# Patient Record
Sex: FEMALE | Race: BLACK OR AFRICAN AMERICAN | Employment: OTHER | URBAN - METROPOLITAN AREA
[De-identification: names, ages, dates, MRNs, and addresses within clinical notes are randomized per-mention and may not be internally consistent; named-entity substitution may affect disease eponyms.]

---

## 2017-03-31 ENCOUNTER — HOSPITAL ENCOUNTER (OUTPATIENT)
Dept: MAMMOGRAPHY | Age: 57
Discharge: HOME | End: 2017-03-31
Payer: COMMERCIAL

## 2017-03-31 DIAGNOSIS — Z12.31 VISIT FOR SCREENING MAMMOGRAM: ICD-10-CM

## 2017-03-31 PROCEDURE — 77063 BREAST TOMOSYNTHESIS BI: CPT

## 2017-05-10 ENCOUNTER — EMPLOYEE WELLNESS (OUTPATIENT)
Dept: OTHER | Age: 57
End: 2017-05-10

## 2017-05-10 LAB
CHOLESTEROL, TOTAL: 280 MG/DL (ref 0–199)
GLUCOSE BLD-MCNC: 84 MG/DL (ref 74–109)
HDLC SERPL-MCNC: 132 MG/DL (ref 40–59)
LDL CHOLESTEROL CALCULATED: 136 MG/DL (ref 0–129)
TRIGL SERPL-MCNC: 58 MG/DL (ref 0–200)

## 2017-07-14 ENCOUNTER — HOSPITAL ENCOUNTER (OUTPATIENT)
Dept: WOMENS IMAGING | Age: 57
Discharge: HOME | End: 2017-07-14
Payer: COMMERCIAL

## 2017-07-14 DIAGNOSIS — Z13.9 SCREENING: ICD-10-CM

## 2017-07-14 PROCEDURE — 77063 BREAST TOMOSYNTHESIS BI: CPT

## 2018-03-20 VITALS — BODY MASS INDEX: 21.11 KG/M2 | WEIGHT: 123 LBS

## 2018-04-26 ENCOUNTER — EMPLOYEE WELLNESS (OUTPATIENT)
Dept: INTERNAL MEDICINE CLINIC | Age: 58
End: 2018-04-26

## 2018-04-26 LAB
CHOLESTEROL, TOTAL: 248 MG/DL (ref 0–199)
GLUCOSE BLD-MCNC: 92 MG/DL (ref 74–109)
HDLC SERPL-MCNC: 111 MG/DL (ref 40–59)
LDL CHOLESTEROL CALCULATED: 121 MG/DL (ref 0–129)
TRIGL SERPL-MCNC: 80 MG/DL (ref 0–200)

## 2018-05-02 VITALS — BODY MASS INDEX: 21.46 KG/M2 | WEIGHT: 125 LBS

## 2018-05-10 ENCOUNTER — HOSPITAL ENCOUNTER (OUTPATIENT)
Dept: MAMMOGRAPHY | Age: 58
Discharge: HOME | End: 2018-05-10
Payer: COMMERCIAL

## 2018-05-10 DIAGNOSIS — Z12.31 VISIT FOR SCREENING MAMMOGRAM: ICD-10-CM

## 2018-05-10 PROCEDURE — 77063 BREAST TOMOSYNTHESIS BI: CPT

## 2018-06-08 ENCOUNTER — HOSPITAL ENCOUNTER (OUTPATIENT)
Dept: GENERAL RADIOLOGY | Age: 58
Discharge: HOME | End: 2018-06-08
Payer: COMMERCIAL

## 2018-06-08 DIAGNOSIS — M25.512 LEFT SHOULDER PAIN: ICD-10-CM

## 2018-06-08 DIAGNOSIS — R52 PAIN: ICD-10-CM

## 2018-06-08 PROCEDURE — 73030 X-RAY EXAM OF SHOULDER: CPT

## 2018-06-08 PROCEDURE — 73050 X-RAY EXAM OF SHOULDERS: CPT

## 2018-08-21 ENCOUNTER — HOSPITAL ENCOUNTER (OUTPATIENT)
Dept: WOMENS IMAGING | Age: 58
Discharge: HOME | End: 2018-08-23
Payer: COMMERCIAL

## 2018-08-21 DIAGNOSIS — Z12.31 ENCOUNTER FOR SCREENING MAMMOGRAM FOR BREAST CANCER: ICD-10-CM

## 2018-08-21 PROCEDURE — 77063 BREAST TOMOSYNTHESIS BI: CPT

## 2018-09-06 ENCOUNTER — HOSPITAL ENCOUNTER (OUTPATIENT)
Dept: GENERAL RADIOLOGY | Age: 58
Discharge: HOME | End: 2018-09-06
Payer: COMMERCIAL

## 2018-09-06 DIAGNOSIS — M79.671 RIGHT FOOT PAIN: ICD-10-CM

## 2018-09-06 PROCEDURE — 73630 X-RAY EXAM OF FOOT: CPT

## 2019-02-25 ENCOUNTER — E-VISIT (OUTPATIENT)
Dept: INTERNAL MEDICINE CLINIC | Age: 59
End: 2019-02-25
Payer: COMMERCIAL

## 2019-02-25 DIAGNOSIS — J20.9 ACUTE BRONCHITIS, UNSPECIFIED ORGANISM: Primary | ICD-10-CM

## 2019-02-25 PROCEDURE — 98969 PR NONPHYSICIAN ONLINE ASSESSMENT AND MANAGEMENT: CPT | Performed by: FAMILY MEDICINE

## 2019-02-25 RX ORDER — AZITHROMYCIN 250 MG/1
TABLET, FILM COATED ORAL
Qty: 1 PACKET | Refills: 0 | Status: SHIPPED | OUTPATIENT
Start: 2019-02-25 | End: 2019-03-07

## 2019-02-25 ASSESSMENT — LIFESTYLE VARIABLES: SMOKING_STATUS: NO, I'VE NEVER SMOKED

## 2019-05-23 ENCOUNTER — HOSPITAL ENCOUNTER (OUTPATIENT)
Dept: GENERAL RADIOLOGY | Age: 59
Discharge: HOME | End: 2019-05-23
Payer: COMMERCIAL

## 2019-05-23 DIAGNOSIS — M25.561 RIGHT KNEE PAIN: ICD-10-CM

## 2019-05-23 PROCEDURE — 73562 X-RAY EXAM OF KNEE 3: CPT

## 2019-05-31 ENCOUNTER — HOSPITAL ENCOUNTER (OUTPATIENT)
Dept: MAMMOGRAPHY | Age: 59
Discharge: HOME | End: 2019-05-31
Payer: COMMERCIAL

## 2019-05-31 DIAGNOSIS — Z12.31 VISIT FOR SCREENING MAMMOGRAM: ICD-10-CM

## 2019-05-31 PROCEDURE — 77063 BREAST TOMOSYNTHESIS BI: CPT

## 2019-08-22 ENCOUNTER — HOSPITAL ENCOUNTER (OUTPATIENT)
Dept: WOMENS IMAGING | Age: 59
Discharge: HOME | End: 2019-08-24
Payer: COMMERCIAL

## 2019-08-22 DIAGNOSIS — Z12.31 ENCOUNTER FOR SCREENING MAMMOGRAM FOR BREAST CANCER: ICD-10-CM

## 2019-08-22 PROCEDURE — 77063 BREAST TOMOSYNTHESIS BI: CPT

## 2019-11-07 ENCOUNTER — OFFICE VISIT (OUTPATIENT)
Dept: FAMILY MEDICINE CLINIC | Age: 59
End: 2019-11-07
Payer: COMMERCIAL

## 2019-11-07 VITALS
WEIGHT: 131 LBS | TEMPERATURE: 97.6 F | DIASTOLIC BLOOD PRESSURE: 70 MMHG | RESPIRATION RATE: 14 BRPM | BODY MASS INDEX: 22.36 KG/M2 | SYSTOLIC BLOOD PRESSURE: 118 MMHG | HEART RATE: 67 BPM | HEIGHT: 64 IN | OXYGEN SATURATION: 98 %

## 2019-11-07 DIAGNOSIS — D49.3 NEOPLASM OF BREAST: Primary | ICD-10-CM

## 2019-11-07 PROCEDURE — 99213 OFFICE O/P EST LOW 20 MIN: CPT | Performed by: FAMILY MEDICINE

## 2019-11-07 ASSESSMENT — PATIENT HEALTH QUESTIONNAIRE - PHQ9
SUM OF ALL RESPONSES TO PHQ QUESTIONS 1-9: 0
2. FEELING DOWN, DEPRESSED OR HOPELESS: 0
SUM OF ALL RESPONSES TO PHQ QUESTIONS 1-9: 0
1. LITTLE INTEREST OR PLEASURE IN DOING THINGS: 0
SUM OF ALL RESPONSES TO PHQ9 QUESTIONS 1 & 2: 0

## 2019-11-11 ENCOUNTER — HOSPITAL ENCOUNTER (OUTPATIENT)
Dept: ULTRASOUND IMAGING | Age: 59
Discharge: HOME | End: 2019-11-13
Payer: COMMERCIAL

## 2019-11-11 ENCOUNTER — HOSPITAL ENCOUNTER (OUTPATIENT)
Dept: WOMENS IMAGING | Age: 59
Discharge: HOME | End: 2019-11-13
Payer: COMMERCIAL

## 2019-11-11 ENCOUNTER — TELEPHONE (OUTPATIENT)
Dept: FAMILY MEDICINE CLINIC | Age: 59
End: 2019-11-11

## 2019-11-11 DIAGNOSIS — R92.8 ABNORMAL MAMMOGRAM OF LEFT BREAST: Primary | ICD-10-CM

## 2019-11-11 DIAGNOSIS — D49.3 NEOPLASM OF BREAST: ICD-10-CM

## 2019-11-11 DIAGNOSIS — R92.8 ABNORMAL MAMMOGRAM: ICD-10-CM

## 2019-11-11 PROCEDURE — G0279 TOMOSYNTHESIS, MAMMO: HCPCS

## 2019-11-11 PROCEDURE — 76642 ULTRASOUND BREAST LIMITED: CPT

## 2020-06-26 ENCOUNTER — HOSPITAL ENCOUNTER (OUTPATIENT)
Dept: MAMMOGRAPHY | Age: 60
Discharge: HOME | End: 2020-06-26
Attending: FAMILY MEDICINE
Payer: COMMERCIAL

## 2020-06-26 DIAGNOSIS — Z12.31 VISIT FOR SCREENING MAMMOGRAM: ICD-10-CM

## 2020-06-26 PROCEDURE — 77063 BREAST TOMOSYNTHESIS BI: CPT

## 2020-08-25 ENCOUNTER — EMPLOYEE WELLNESS (OUTPATIENT)
Dept: OTHER | Age: 60
End: 2020-08-25

## 2020-08-25 LAB
CHOLESTEROL, TOTAL: 224 MG/DL (ref 0–199)
GLUCOSE BLD-MCNC: 93 MG/DL (ref 70–99)
HDLC SERPL-MCNC: 81 MG/DL (ref 40–59)
LDL CHOLESTEROL CALCULATED: 116 MG/DL (ref 0–129)
TRIGL SERPL-MCNC: 134 MG/DL (ref 0–150)

## 2020-10-19 VITALS — BODY MASS INDEX: 22.14 KG/M2 | WEIGHT: 129 LBS

## 2020-10-20 ENCOUNTER — HOSPITAL ENCOUNTER (OUTPATIENT)
Dept: WOMENS IMAGING | Age: 60
Discharge: HOME | End: 2020-10-22
Payer: COMMERCIAL

## 2020-10-20 PROCEDURE — 77063 BREAST TOMOSYNTHESIS BI: CPT

## 2021-07-02 ENCOUNTER — HOSPITAL ENCOUNTER (OUTPATIENT)
Dept: WOMENS IMAGING | Age: 61
Discharge: HOME | End: 2021-07-02
Attending: FAMILY MEDICINE
Payer: COMMERCIAL

## 2021-07-02 DIAGNOSIS — Z13.9 ENCOUNTER FOR SCREENING: ICD-10-CM

## 2021-07-02 PROCEDURE — 77063 BREAST TOMOSYNTHESIS BI: CPT

## 2021-07-23 LAB
CHOLESTEROL, TOTAL: 261 MG/DL (ref 0–199)
GLUCOSE BLD-MCNC: 89 MG/DL (ref 70–99)
HDLC SERPL-MCNC: 88 MG/DL (ref 40–59)
LDL CHOLESTEROL CALCULATED: 149 MG/DL (ref 0–129)
TRIGL SERPL-MCNC: 119 MG/DL (ref 0–150)

## 2021-11-04 ENCOUNTER — HOSPITAL ENCOUNTER (OUTPATIENT)
Dept: WOMENS IMAGING | Age: 61
Discharge: HOME | End: 2021-11-06
Payer: COMMERCIAL

## 2021-11-04 VITALS — BODY MASS INDEX: 22.14 KG/M2 | HEIGHT: 64 IN

## 2021-11-04 DIAGNOSIS — Z12.31 ENCOUNTER FOR SCREENING MAMMOGRAM FOR MALIGNANT NEOPLASM OF BREAST: ICD-10-CM

## 2021-11-04 PROCEDURE — 77063 BREAST TOMOSYNTHESIS BI: CPT

## 2022-04-03 ENCOUNTER — OFFICE VISIT (OUTPATIENT)
Dept: FAMILY MEDICINE CLINIC | Age: 62
End: 2022-04-03

## 2022-04-03 ENCOUNTER — OFFICE VISIT (OUTPATIENT)
Dept: FAMILY MEDICINE CLINIC | Age: 62
End: 2022-04-03
Payer: COMMERCIAL

## 2022-04-03 VITALS
SYSTOLIC BLOOD PRESSURE: 128 MMHG | BODY MASS INDEX: 22.2 KG/M2 | HEIGHT: 64 IN | DIASTOLIC BLOOD PRESSURE: 78 MMHG | OXYGEN SATURATION: 97 % | TEMPERATURE: 98.5 F | WEIGHT: 130 LBS

## 2022-04-03 VITALS
DIASTOLIC BLOOD PRESSURE: 78 MMHG | OXYGEN SATURATION: 97 % | HEART RATE: 87 BPM | WEIGHT: 130 LBS | SYSTOLIC BLOOD PRESSURE: 128 MMHG | BODY MASS INDEX: 22.2 KG/M2 | TEMPERATURE: 98.5 F | HEIGHT: 64 IN

## 2022-04-03 DIAGNOSIS — H00.011 HORDEOLUM EXTERNUM OF RIGHT UPPER EYELID: Primary | ICD-10-CM

## 2022-04-03 PROCEDURE — 99213 OFFICE O/P EST LOW 20 MIN: CPT | Performed by: PHYSICIAN ASSISTANT

## 2022-04-03 RX ORDER — SULFAMETHOXAZOLE AND TRIMETHOPRIM 800; 160 MG/1; MG/1
1 TABLET ORAL 2 TIMES DAILY
Qty: 14 TABLET | Refills: 0 | Status: SHIPPED | OUTPATIENT
Start: 2022-04-03 | End: 2022-04-10

## 2022-04-03 RX ORDER — PAROXETINE 10 MG/1
10 TABLET, FILM COATED ORAL EVERY MORNING
COMMUNITY

## 2022-04-03 SDOH — ECONOMIC STABILITY: FOOD INSECURITY: WITHIN THE PAST 12 MONTHS, THE FOOD YOU BOUGHT JUST DIDN'T LAST AND YOU DIDN'T HAVE MONEY TO GET MORE.: NEVER TRUE

## 2022-04-03 SDOH — ECONOMIC STABILITY: FOOD INSECURITY: WITHIN THE PAST 12 MONTHS, YOU WORRIED THAT YOUR FOOD WOULD RUN OUT BEFORE YOU GOT MONEY TO BUY MORE.: NEVER TRUE

## 2022-04-03 ASSESSMENT — PATIENT HEALTH QUESTIONNAIRE - PHQ9
SUM OF ALL RESPONSES TO PHQ QUESTIONS 1-9: 0
SUM OF ALL RESPONSES TO PHQ QUESTIONS 1-9: 0
1. LITTLE INTEREST OR PLEASURE IN DOING THINGS: 0
SUM OF ALL RESPONSES TO PHQ QUESTIONS 1-9: 0
SUM OF ALL RESPONSES TO PHQ QUESTIONS 1-9: 0
2. FEELING DOWN, DEPRESSED OR HOPELESS: 0
SUM OF ALL RESPONSES TO PHQ9 QUESTIONS 1 & 2: 0

## 2022-04-03 ASSESSMENT — ENCOUNTER SYMPTOMS
PHOTOPHOBIA: 0
SHORTNESS OF BREATH: 0
EYE REDNESS: 1
VOMITING: 0
EYE DISCHARGE: 1
EYE PAIN: 1
BLOOD IN STOOL: 0
NAUSEA: 0

## 2022-04-03 ASSESSMENT — SOCIAL DETERMINANTS OF HEALTH (SDOH): HOW HARD IS IT FOR YOU TO PAY FOR THE VERY BASICS LIKE FOOD, HOUSING, MEDICAL CARE, AND HEATING?: NOT VERY HARD

## 2022-04-03 NOTE — PROGRESS NOTES
Subjective:      Patient ID: Richmond De La Paz is a 57 y.o. female who presents today for:  Chief Complaint   Patient presents with    Eye Problem     left eye looks red and puffy        Pt presents w/ swelling to R eye that worsened and now w/erythema, scant crusting drainage, tenderness  No FCNVD        No past medical history on file. No past surgical history on file. No family history on file. Social History     Socioeconomic History    Marital status:      Spouse name: Not on file    Number of children: Not on file    Years of education: Not on file    Highest education level: Not on file   Occupational History    Not on file   Tobacco Use    Smoking status: Not on file    Smokeless tobacco: Not on file   Substance and Sexual Activity    Alcohol use: Not on file    Drug use: Not on file    Sexual activity: Not on file   Other Topics Concern    Not on file   Social History Narrative    Not on file     Social Determinants of Health     Financial Resource Strain: Low Risk     Difficulty of Paying Living Expenses: Not very hard   Food Insecurity: No Food Insecurity    Worried About Running Out of Food in the Last Year: Never true    Guillermo of Food in the Last Year: Never true   Transportation Needs:     Lack of Transportation (Medical): Not on file    Lack of Transportation (Non-Medical):  Not on file   Physical Activity:     Days of Exercise per Week: Not on file    Minutes of Exercise per Session: Not on file   Stress:     Feeling of Stress : Not on file   Social Connections:     Frequency of Communication with Friends and Family: Not on file    Frequency of Social Gatherings with Friends and Family: Not on file    Attends Evangelical Services: Not on file    Active Member of Clubs or Organizations: Not on file    Attends Club or Organization Meetings: Not on file    Marital Status: Not on file   Intimate Partner Violence:     Fear of Current or Ex-Partner: Not on file   Joanne Villa Emotionally Abused: Not on file    Physically Abused: Not on file    Sexually Abused: Not on file   Housing Stability:     Unable to Pay for Housing in the Last Year: Not on file    Number of Places Lived in the Last Year: Not on file    Unstable Housing in the Last Year: Not on file     Current Outpatient Medications on File Prior to Visit   Medication Sig Dispense Refill    PARoxetine (PAXIL) 10 MG tablet Take 10 mg by mouth every morning       No current facility-administered medications on file prior to visit. Patient has no known allergies. Review of Systems   HENT: Negative for ear discharge, ear pain, hearing loss, nosebleeds and tinnitus. Eyes: Positive for pain, discharge and redness. Negative for photophobia and visual disturbance. Respiratory: Negative for shortness of breath. Cardiovascular: Negative for chest pain. Gastrointestinal: Negative for blood in stool, nausea and vomiting. Endocrine: Negative for polydipsia. Musculoskeletal: Negative for myalgias. Skin: Negative for rash. Neurological: Negative for headaches. Hematological: Does not bruise/bleed easily. Psychiatric/Behavioral: Negative for hallucinations and suicidal ideas. All other systems reviewed and are negative. Objective:   /78 (Site: Left Upper Arm, Position: Sitting, Cuff Size: Medium Adult)   Pulse 87   Temp 98.5 °F (36.9 °C) (Temporal)   Ht 5' 4\" (1.626 m)   Wt 130 lb (59 kg)   SpO2 97%   BMI 22.31 kg/m²     Physical Exam  Vitals and nursing note reviewed. Constitutional:       Appearance: She is well-developed. HENT:      Head: Normocephalic and atraumatic. Right Ear: External ear normal.      Left Ear: External ear normal.      Nose: No congestion. Mouth/Throat:      Mouth: Mucous membranes are moist.      Pharynx: No posterior oropharyngeal erythema. Eyes:      General: No scleral icterus. Extraocular Movements: Extraocular movements intact. Conjunctiva/sclera: Conjunctivae normal.      Pupils: Pupils are equal, round, and reactive to light. Comments: R upper lid centrally located nodular swelling w/erythema and TTP   Neck:      Thyroid: No thyromegaly. Cardiovascular:      Rate and Rhythm: Normal rate and regular rhythm. Heart sounds: No murmur heard. Pulmonary:      Effort: Pulmonary effort is normal. No respiratory distress. Breath sounds: Normal breath sounds. No wheezing. Abdominal:      General: Bowel sounds are normal. There is no distension. Palpations: Abdomen is soft. Musculoskeletal:         General: Normal range of motion. Cervical back: Normal range of motion and neck supple. Skin:     General: Skin is warm and dry. Neurological:      Mental Status: She is alert and oriented to person, place, and time. Cranial Nerves: No cranial nerve deficit. Psychiatric:         Mood and Affect: Mood normal.         Behavior: Behavior normal.         Thought Content: Thought content normal.         Judgment: Judgment normal.         Assessment:       Diagnosis Orders   1. Hordeolum externum of right upper eyelid  sulfamethoxazole-trimethoprim (BACTRIM DS) 800-160 MG per tablet       Plan:      No orders of the defined types were placed in this encounter. Orders Placed This Encounter   Medications    sulfamethoxazole-trimethoprim (BACTRIM DS) 800-160 MG per tablet     Sig: Take 1 tablet by mouth 2 times daily for 7 days     Dispense:  14 tablet     Refill:  0       Return if symptoms worsen or fail to improve, for follow up w/PCP in 1-2 weeks. Reviewed with the patient: current clinicalstatus, medications, activities and diet. Side effects, adverse effects of the medication prescribedtoday, as well as treatment plan/ rationale and result expectations have been discussedwith the patient who expresses understanding and desires to proceed.     Close follow upto evaluate treatment results and for coordination of care. I have reviewedthe patient's medical history in detail and updated the computerized patient record.     Rafal Sheldon PA-C

## 2022-04-13 ASSESSMENT — ENCOUNTER SYMPTOMS
EYE DISCHARGE: 1
NAUSEA: 0
VOMITING: 0
EYE PAIN: 1
BLOOD IN STOOL: 0
EYE REDNESS: 1
PHOTOPHOBIA: 0
SHORTNESS OF BREATH: 0

## 2022-04-13 NOTE — PROGRESS NOTES
Subjective:      Patient ID: Jose Hendrix is a 57 y.o. female who presents today for:  Chief Complaint   Patient presents with    Eye Problem       Pt presents w/ swelling to R eye that worsened and now w/erythema, scant crusting drainage, tenderness  No FCNVD      Past Medical History:   Diagnosis Date    High triglycerides 2/19/2013    Low back pain 2/19/2013    Migraine equivalent 9/24/2015     Past Surgical History:   Procedure Laterality Date    DILATION AND CURETTAGE OF UTERUS  1984    HYSTERECTOMY  2005    total    OVARY REMOVAL      TONSILLECTOMY  1960     Family History   Problem Relation Age of Onset    Breast Cancer Mother      Social History     Socioeconomic History    Marital status:      Spouse name: Not on file    Number of children: Not on file    Years of education: Not on file    Highest education level: Not on file   Occupational History    Not on file   Tobacco Use    Smoking status: Never Smoker    Smokeless tobacco: Never Used   Substance and Sexual Activity    Alcohol use: No    Drug use: No    Sexual activity: Yes     Partners: Male   Other Topics Concern    Not on file   Social History Narrative    Not on file     Social Determinants of Health     Financial Resource Strain:     Difficulty of Paying Living Expenses: Not on file   Food Insecurity:     Worried About [address] in the Last Year: Not on file    Lee of Food in the Last Year: Not on file   Transportation Needs:     Lack of Transportation (Medical): Not on file    Lack of Transportation (Non-Medical):  Not on file   Physical Activity:     Days of Exercise per Week: Not on file    Minutes of Exercise per Session: Not on file   Stress:     Feeling of Stress : Not on file   Social Connections:     Frequency of Communication with Friends and Family: Not on file    Frequency of Social Gatherings with Friends and Family: Not on file    Attends Confucianism Services: Not on file   Sonal Active Member of Clubs or Organizations: Not on file    Attends Club or Organization Meetings: Not on file    Marital Status: Not on file   Intimate Partner Violence:     Fear of Current or Ex-Partner: Not on file    Emotionally Abused: Not on file    Physically Abused: Not on file    Sexually Abused: Not on file   Housing Stability:     Unable to Pay for Housing in the Last Year: Not on file    Number of [place] in the Last Year: Not on file    Unstable Housing in the Last Year: Not on file     Current Outpatient Medications on File Prior to Visit   Medication Sig Dispense Refill    aspirin 81 MG tablet Take 81 mg by mouth daily      PARoxetine (PAXIL) 10 MG tablet Take 1 tablet by mouth daily (replaces Brisdelle) 1 tablet 0    folic acid (FOLVITE) 031 MCG tablet Take 400 mcg by mouth daily      calcium carbonate (OSCAL) 500 MG TABS tablet Take 500 mg by mouth 2 times daily      valACYclovir (VALTREX) 500 MG tablet Take 500 mg by mouth daily. No current facility-administered medications on file prior to visit. Patient has no known allergies. Review of Systems   HENT: Negative for ear discharge, ear pain, hearing loss, nosebleeds and tinnitus. Eyes: Positive for pain, discharge and redness. Negative for photophobia. Respiratory: Negative for shortness of breath. Cardiovascular: Negative for chest pain. Gastrointestinal: Negative for blood in stool, nausea and vomiting. Endocrine: Negative for polydipsia. Musculoskeletal: Negative for myalgias. Skin: Negative for rash. Neurological: Negative for headaches. Hematological: Does not bruise/bleed easily. Psychiatric/Behavioral: Negative for hallucinations and suicidal ideas. All other systems reviewed and are negative.       Objective:   /78 (Site: Left Upper Arm, Position: Sitting, Cuff Size: Medium Adult)   Temp 98.5 °F (36.9 °C) (Temporal)   Ht 5' 4\" (1.626 m)   Wt 130 lb (59 kg)   SpO2 97%   BMI 22.31 kg/m²     Physical Exam  Vitals and nursing note reviewed. Constitutional:       Appearance: She is well-developed. HENT:      Head: Normocephalic and atraumatic. Right Ear: External ear normal.      Left Ear: External ear normal.      Nose: Nose normal. No congestion. Mouth/Throat:      Mouth: Mucous membranes are moist.   Eyes:      General: No scleral icterus. Right eye: Discharge present. Left eye: No discharge. Extraocular Movements: Extraocular movements intact. Pupils: Pupils are equal, round, and reactive to light. Comments: R periorbital swelling, normal conjunctiva    Neck:      Thyroid: No thyromegaly. Cardiovascular:      Rate and Rhythm: Normal rate and regular rhythm. Heart sounds: No murmur heard. Pulmonary:      Effort: Pulmonary effort is normal. No respiratory distress. Breath sounds: Normal breath sounds. No wheezing. Abdominal:      General: Bowel sounds are normal. There is no distension. Palpations: Abdomen is soft. Musculoskeletal:         General: Normal range of motion. Cervical back: Normal range of motion and neck supple. Skin:     General: Skin is warm and dry. Neurological:      Mental Status: She is alert and oriented to person, place, and time. Cranial Nerves: No cranial nerve deficit. Assessment:       Diagnosis Orders   1. Hordeolum externum of right upper eyelid  sulfamethoxazole-trimethoprim (BACTRIM DS) 800-160 MG per tablet       Plan:      No orders of the defined types were placed in this encounter. Orders Placed This Encounter   Medications    sulfamethoxazole-trimethoprim (BACTRIM DS) 800-160 MG per tablet     Sig: Take 1 tablet by mouth 2 times daily for 7 days     Dispense:  14 tablet     Refill:  0       Return if symptoms worsen or fail to improve, for follow up w/PCP in 1-2 weeks. Reviewed with the patient: current clinicalstatus, medications, activities and diet. Side effects, adverse effects of the medication prescribedtoday, as well as treatment plan/ rationale and result expectations have been discussedwith the patient who expresses understanding and desires to proceed. Close follow upto evaluate treatment results and for coordination of care. I have reviewedthe patient's medical history in detail and updated the computerized patient record.     Laci Galarza PA-C

## 2022-05-16 ENCOUNTER — TELEPHONE (OUTPATIENT)
Dept: GASTROENTEROLOGY | Age: 62
End: 2022-05-16

## 2022-05-16 DIAGNOSIS — Z12.11 ENCOUNTER FOR SCREENING COLONOSCOPY: Primary | ICD-10-CM

## 2022-05-16 NOTE — TELEPHONE ENCOUNTER
Spoke to patient, agreeable to colonoscopy. Patient scheduled July 26 at 8 am. Miralax Prep mailed to patient. Referral pended to PCP. Info faxed to Sanjana Parkinson.

## 2022-05-17 ENCOUNTER — COMMUNITY OUTREACH (OUTPATIENT)
Dept: INTERNAL MEDICINE | Age: 62
End: 2022-05-17

## 2022-07-07 ENCOUNTER — HOSPITAL ENCOUNTER (OUTPATIENT)
Dept: WOMENS IMAGING | Age: 62
Discharge: HOME | End: 2022-07-07
Attending: FAMILY MEDICINE
Payer: COMMERCIAL

## 2022-07-07 DIAGNOSIS — Z12.31 ENCOUNTER FOR SCREENING MAMMOGRAM FOR BREAST CANCER: ICD-10-CM

## 2022-07-07 PROCEDURE — 77063 BREAST TOMOSYNTHESIS BI: CPT

## 2022-07-16 ENCOUNTER — TRANSCRIBE ORDER (OUTPATIENT)
Dept: SCHEDULING | Age: 62
End: 2022-07-16

## 2022-07-16 DIAGNOSIS — Z12.39 ENCOUNTER FOR SCREENING FOR MALIGNANT NEOPLASM OF BREAST, UNSPECIFIED SCREENING MODALITY: Primary | ICD-10-CM

## 2022-07-20 ENCOUNTER — NURSE TRIAGE (OUTPATIENT)
Dept: OTHER | Facility: CLINIC | Age: 62
End: 2022-07-20

## 2022-07-20 ENCOUNTER — OFFICE VISIT (OUTPATIENT)
Dept: FAMILY MEDICINE CLINIC | Age: 62
End: 2022-07-20
Payer: COMMERCIAL

## 2022-07-20 VITALS
HEART RATE: 66 BPM | BODY MASS INDEX: 25.03 KG/M2 | WEIGHT: 146.6 LBS | SYSTOLIC BLOOD PRESSURE: 128 MMHG | DIASTOLIC BLOOD PRESSURE: 72 MMHG | OXYGEN SATURATION: 97 % | HEIGHT: 64 IN | TEMPERATURE: 97.2 F

## 2022-07-20 DIAGNOSIS — M25.562 POSTERIOR KNEE PAIN, LEFT: Primary | ICD-10-CM

## 2022-07-20 PROCEDURE — 99213 OFFICE O/P EST LOW 20 MIN: CPT

## 2022-07-20 RX ORDER — COVID-19 ANTIGEN TEST
KIT MISCELLANEOUS
COMMUNITY
Start: 2022-05-19

## 2022-07-20 RX ORDER — MELOXICAM 15 MG/1
15 TABLET ORAL DAILY
Qty: 30 TABLET | Refills: 0 | Status: SHIPPED | OUTPATIENT
Start: 2022-07-20 | End: 2022-08-19

## 2022-07-20 RX ORDER — METHYLPREDNISOLONE 4 MG/1
TABLET ORAL
Qty: 1 KIT | Refills: 0 | Status: SHIPPED | OUTPATIENT
Start: 2022-07-20 | End: 2022-07-26

## 2022-07-20 SDOH — ECONOMIC STABILITY: FOOD INSECURITY: WITHIN THE PAST 12 MONTHS, THE FOOD YOU BOUGHT JUST DIDN'T LAST AND YOU DIDN'T HAVE MONEY TO GET MORE.: NEVER TRUE

## 2022-07-20 SDOH — ECONOMIC STABILITY: FOOD INSECURITY: WITHIN THE PAST 12 MONTHS, YOU WORRIED THAT YOUR FOOD WOULD RUN OUT BEFORE YOU GOT MONEY TO BUY MORE.: NEVER TRUE

## 2022-07-20 ASSESSMENT — PATIENT HEALTH QUESTIONNAIRE - PHQ9
SUM OF ALL RESPONSES TO PHQ9 QUESTIONS 1 & 2: 0
2. FEELING DOWN, DEPRESSED OR HOPELESS: 0
1. LITTLE INTEREST OR PLEASURE IN DOING THINGS: 0
SUM OF ALL RESPONSES TO PHQ QUESTIONS 1-9: 0

## 2022-07-20 ASSESSMENT — ENCOUNTER SYMPTOMS
BACK PAIN: 0
WHEEZING: 0
SHORTNESS OF BREATH: 0
COUGH: 0
CHEST TIGHTNESS: 0
COLOR CHANGE: 0

## 2022-07-20 ASSESSMENT — SOCIAL DETERMINANTS OF HEALTH (SDOH): HOW HARD IS IT FOR YOU TO PAY FOR THE VERY BASICS LIKE FOOD, HOUSING, MEDICAL CARE, AND HEATING?: NOT HARD AT ALL

## 2022-07-20 NOTE — PROGRESS NOTES
[address], 57 y.o. female presents today with:  Chief Complaint   Patient presents with    Leg Pain     Leg pain, taking ibuprofen is not working,symptoms start 7/2       Leg Pain   The incident occurred more than 1 week ago (Pt states she was in the driveway at her house and she jumped down off of the hitch of a boat trailor and landed hard on the driveway. Denies direct trauma to the left knee. Is unsure if she twisted her knee when this happened. ). The incident occurred at home. The injury mechanism is unknown. The pain is present in the left knee. The quality of the pain is described as aching. The pain is at a severity of 4/10. The pain is moderate. The pain has been Fluctuating since onset. Pertinent negatives include no inability to bear weight, loss of motion, loss of sensation, numbness or tingling. She reports no foreign bodies present. The symptoms are aggravated by weight bearing, palpation and movement. She has tried elevation and NSAIDs for the symptoms. The treatment provided mild (Pt concerned with taking ibuprofen for an extended period of time and is requesting mobic for pain.) relief. Pt states the pain in her knee was manageable while on vacation last week but has increased in severity since returning and work and walking more frequently. Pain elicited with walking and certain movements. Also expresses pain with palpation of LCL. Review of Systems   Constitutional:  Negative for chills, fatigue and fever. Respiratory:  Negative for cough, chest tightness, shortness of breath and wheezing. Cardiovascular:  Negative for chest pain and palpitations. Musculoskeletal:  Positive for myalgias (posterior left knee pain). Negative for arthralgias, back pain and gait problem. Skin:  Negative for color change, rash and wound. Neurological:  Negative for dizziness, tingling, weakness and numbness.        PMH, Surgical Hx, Family Hx, and Social Hx reviewed and updated. Objective  Vitals:    07/20/22 1357   BP: 128/72   Pulse: 66   Temp: 97.2 °F (36.2 °C)   SpO2: 97%   Weight: 146 lb 9.6 oz (66.5 kg)   Height: 5' 4\" (1.626 m)     BP Readings from Last 3 Encounters:   07/20/22 128/72   04/03/22 128/78   11/07/19 118/70     Wt Readings from Last 3 Encounters:   07/20/22 146 lb 9.6 oz (66.5 kg)   04/03/22 130 lb (59 kg)   08/25/20 129 lb (58.5 kg)     Physical Exam  Vitals reviewed. Constitutional:       General: She is not in acute distress. Appearance: Normal appearance. HENT:      Head: Normocephalic and atraumatic. Eyes:      General: Lids are normal.   Cardiovascular:      Rate and Rhythm: Normal rate and regular rhythm. Pulmonary:      Effort: Pulmonary effort is normal. No respiratory distress. Breath sounds: Normal air entry. Musculoskeletal:      Cervical back: Normal range of motion. Right knee: Normal.      Left knee: No swelling, erythema, bony tenderness or crepitus. Normal range of motion. Tenderness (with palpation and ambulation) present over the LCL. Normal alignment. Normal pulse. Legs:    Skin:     General: Skin is warm and dry. Neurological:      Mental Status: She is alert and oriented to person, place, and time. Mental status is at baseline. Psychiatric:         Mood and Affect: Mood normal.         Behavior: Behavior is cooperative. Assessment & Plan   1. Posterior knee pain, left  -     [address], [place]  -     methylPREDNISolone (MEDROL DOSEPACK) 4 MG tablet; Take by mouth., Disp-1 kit, R-0Normal  -     meloxicam (MOBIC) 15 MG tablet;  Take 1 tablet by mouth in the morning., Disp-30 tablet, R-0Normal   -Do not take ibuprofen while taking mobic, tylenol ok  -Ice  -Rest  -Elevation  -Immobilization such as splinting for comfort  -Discussed signs and symptoms for when to seek care in ER     Orders Placed This Encounter   Procedures    [address] [facility], [place]     Referral Priority:   Routine     Referral Type:   Eval and Treat     Referral Reason:   Specialty Services Required     Requested Specialty:   Orthopedic Surgery     Number of Visits Requested:   1     Orders Placed This Encounter   Medications    methylPREDNISolone (MEDROL DOSEPACK) 4 MG tablet     Sig: Take by mouth. Dispense:  1 kit     Refill:  0    meloxicam (MOBIC) 15 MG tablet     Sig: Take 1 tablet by mouth in the morning. Dispense:  30 tablet     Refill:  0     Return if symptoms worsen or fail to improve, for follow up with PCP, Orthopedics PRN. Reviewed with the patient: current clinical status, medications, activities and diet. Side effects, adverse effects of the medication prescribed today, as well as treatment plan/ rationale and result expectations have been discussed with the patient who expresses understanding and desires to proceed. Close follow up to evaluate treatment results and for coordination of care. I have reviewed the patient's medical history in detail and updated the computerized patient record.     Maple Kanner, APRN - NP

## 2022-07-20 NOTE — TELEPHONE ENCOUNTER
Received call from Jacinta at [facility] with Red Flag Complaint. Current Symptoms: Pt was jumping off of a boat onto a trailer when she injured her left leg. She reports pain and swelling behind the her left knee and calf. She is able to bear weight and stand. Onset: 3 weeks ago    Associated Symptoms: NA    Pain Severity: 3-4/10; Pain comes and goes     Temperature: NA    What has been tried: Ibuprofen, Elevation    Recommended disposition: See in Office Today or Tomorrow    Care advice provided, patient verbalizes understanding; denies any other questions or concerns; instructed to call back for any new or worsening symptoms. Patient/Caller agrees with recommended disposition; writer provided warm transfer to Barbara Gutierrez at [facility] for appointment scheduling     Attention Provider: Thank you for allowing me to participate in the care of your patient. The patient was connected to triage in response to information provided to the ECC/PSC. Please do not respond through this encounter as the response is not directed to a shared pool.      Reason for Disposition   Injury interferes with work or school    Protocols used: Leg Injury-ADULT-[place]

## 2022-07-27 ENCOUNTER — HOSPITAL ENCOUNTER (OUTPATIENT)
Dept: ULTRASOUND IMAGING | Age: 62
Discharge: HOME | End: 2022-07-27
Attending: FAMILY MEDICINE
Payer: COMMERCIAL

## 2022-07-27 ENCOUNTER — HOSPITAL ENCOUNTER (OUTPATIENT)
Dept: WOMENS IMAGING | Age: 62
Discharge: HOME | End: 2022-07-27
Attending: FAMILY MEDICINE
Payer: COMMERCIAL

## 2022-07-27 DIAGNOSIS — R92.8 ABNORMAL FINDING ON BREAST IMAGING: ICD-10-CM

## 2022-07-27 PROCEDURE — 77061 BREAST TOMOSYNTHESIS UNI: CPT

## 2022-07-27 PROCEDURE — 76642 ULTRASOUND BREAST LIMITED: CPT

## 2022-08-03 ENCOUNTER — COMMUNITY OUTREACH (OUTPATIENT)
Dept: FAMILY MEDICINE CLINIC | Age: 62
End: 2022-08-03

## 2022-08-10 ENCOUNTER — ANESTHESIA (OUTPATIENT)
Dept: ENDOSCOPY | Age: 62
End: 2022-08-10
Payer: COMMERCIAL

## 2022-08-10 ENCOUNTER — HOSPITAL ENCOUNTER (OUTPATIENT)
Age: 62
Setting detail: OUTPATIENT SURGERY
Discharge: HOME | End: 2022-08-10
Attending: INTERNAL MEDICINE | Admitting: INTERNAL MEDICINE
Payer: COMMERCIAL

## 2022-08-10 ENCOUNTER — ANESTHESIA EVENT (OUTPATIENT)
Dept: ENDOSCOPY | Age: 62
End: 2022-08-10
Payer: COMMERCIAL

## 2022-08-10 VITALS
WEIGHT: 130 LBS | TEMPERATURE: 98.6 F | RESPIRATION RATE: 16 BRPM | HEART RATE: 2 BPM | HEIGHT: 64 IN | OXYGEN SATURATION: 98 % | DIASTOLIC BLOOD PRESSURE: 55 MMHG | BODY MASS INDEX: 22.2 KG/M2 | SYSTOLIC BLOOD PRESSURE: 88 MMHG

## 2022-08-10 PROBLEM — Z12.11 SCREENING FOR MALIGNANT NEOPLASM OF COLON: Status: ACTIVE | Noted: 2022-08-10

## 2022-08-10 PROCEDURE — 7100000011 HC PHASE II RECOVERY - ADDTL 15 MIN: Performed by: INTERNAL MEDICINE

## 2022-08-10 PROCEDURE — 6370000000 HC RX 637 (ALT 250 FOR IP): Performed by: INTERNAL MEDICINE

## 2022-08-10 PROCEDURE — 3609027000 HC COLONOSCOPY: Performed by: INTERNAL MEDICINE

## 2022-08-10 PROCEDURE — 2580000003 HC RX 258

## 2022-08-10 PROCEDURE — 3700000001 HC ADD 15 MINUTES (ANESTHESIA): Performed by: INTERNAL MEDICINE

## 2022-08-10 PROCEDURE — 7100000010 HC PHASE II RECOVERY - FIRST 15 MIN: Performed by: INTERNAL MEDICINE

## 2022-08-10 PROCEDURE — 6360000002 HC RX W HCPCS: Performed by: REGISTERED NURSE

## 2022-08-10 PROCEDURE — 2709999900 HC NON-CHARGEABLE SUPPLY: Performed by: INTERNAL MEDICINE

## 2022-08-10 PROCEDURE — 45378 DIAGNOSTIC COLONOSCOPY: CPT | Performed by: INTERNAL MEDICINE

## 2022-08-10 PROCEDURE — 3700000000 HC ANESTHESIA ATTENDED CARE: Performed by: INTERNAL MEDICINE

## 2022-08-10 PROCEDURE — 2580000003 HC RX 258: Performed by: INTERNAL MEDICINE

## 2022-08-10 RX ORDER — SIMETHICONE 20 MG/.3ML
EMULSION ORAL PRN
Status: DISCONTINUED | OUTPATIENT
Start: 2022-08-10 | End: 2022-08-10 | Stop reason: ALTCHOICE

## 2022-08-10 RX ORDER — SODIUM CHLORIDE 9 MG/ML
INJECTION, SOLUTION INTRAVENOUS CONTINUOUS
Status: DISCONTINUED | OUTPATIENT
Start: 2022-08-10 | End: 2022-08-10 | Stop reason: HOSPADM

## 2022-08-10 RX ORDER — PROPOFOL 10 MG/ML
INJECTION, EMULSION INTRAVENOUS PRN
Status: DISCONTINUED | OUTPATIENT
Start: 2022-08-10 | End: 2022-08-10 | Stop reason: SDUPTHER

## 2022-08-10 RX ORDER — SODIUM CHLORIDE 9 MG/ML
INJECTION, SOLUTION INTRAVENOUS
Status: COMPLETED
Start: 2022-08-10 | End: 2022-08-10

## 2022-08-10 RX ORDER — MAGNESIUM HYDROXIDE 1200 MG/15ML
LIQUID ORAL PRN
Status: DISCONTINUED | OUTPATIENT
Start: 2022-08-10 | End: 2022-08-10 | Stop reason: ALTCHOICE

## 2022-08-10 RX ADMIN — PROPOFOL 50 MG: 10 INJECTION, EMULSION INTRAVENOUS at 08:47

## 2022-08-10 RX ADMIN — SODIUM CHLORIDE: 9 INJECTION, SOLUTION INTRAVENOUS at 07:58

## 2022-08-10 RX ADMIN — PROPOFOL 100 MG: 10 INJECTION, EMULSION INTRAVENOUS at 08:31

## 2022-08-10 RX ADMIN — SODIUM CHLORIDE: 9 INJECTION, SOLUTION INTRAVENOUS at 09:00

## 2022-08-10 RX ADMIN — PROPOFOL 50 MG: 10 INJECTION, EMULSION INTRAVENOUS at 08:38

## 2022-08-10 RX ADMIN — PROPOFOL 50 MG: 10 INJECTION, EMULSION INTRAVENOUS at 08:40

## 2022-08-10 RX ADMIN — PROPOFOL 100 MG: 10 INJECTION, EMULSION INTRAVENOUS at 08:35

## 2022-08-10 ASSESSMENT — PAIN SCALES - GENERAL
PAINLEVEL_OUTOF10: 0

## 2022-08-10 ASSESSMENT — PAIN SCALES - WONG BAKER: WONGBAKER_NUMERICALRESPONSE: 0

## 2022-08-10 ASSESSMENT — PAIN - FUNCTIONAL ASSESSMENT: PAIN_FUNCTIONAL_ASSESSMENT: NONE - DENIES PAIN

## 2022-08-10 NOTE — ANESTHESIA POSTPROCEDURE EVALUATION
Department of Anesthesiology  Postprocedure Note    Patient: Jo-Ann Bowles  MRN: [id number]  YOB: 1960  Date of evaluation: 8/10/2022      Procedure Summary     Date: 08/10/22 Room / Location: [address] [place] [address]    Anesthesia Start: 1969 Anesthesia Stop: 3532    Procedure: COLORECTAL CANCER SCREENING, NOT HIGH RISK Diagnosis:       Colon cancer screening      (Colon cancer screening Z12.11)    Surgeons: Mariana Negrete MD Responsible Provider: MARGARITO Gregory CRNA    Anesthesia Type: MAC ASA Status: 2          Anesthesia Type: No value filed.     Román Phase I:      Román Phase II:        Anesthesia Post Evaluation    Patient location during evaluation: bedside  Patient participation: complete - patient participated  Level of consciousness: awake and awake and alert  Airway patency: patent  Nausea & Vomiting: no nausea and no vomiting  Complications: no  Cardiovascular status: blood pressure returned to baseline and hemodynamically stable  Respiratory status: acceptable  Hydration status: euvolemic

## 2022-08-10 NOTE — ANESTHESIA PRE PROCEDURE
Department of Anesthesiology  Preprocedure Note       Name:  Tico Thayer   Age:  57 y.o.  :  1960                                          MRN:  [id number]         Date:  8/10/2022      Surgeon: Radha Thomason):  Devon Guan MD    Procedure: Procedure(s):  COLORECTAL CANCER SCREENING, NOT HIGH RISK    Medications prior to admission:   Prior to Admission medications    Medication Sig Start Date End Date Taking? Authorizing Provider   Erica Nunněbrad 1060 TEST KIT  22   Historical Provider, MD   meloxicam (MOBIC) 15 MG tablet Take 1 tablet by mouth in the morning. 22  Lauren Hatch APRN - NP   aspirin 81 MG tablet Take 81 mg by mouth daily    Historical Provider, MD   PARoxetine (PAXIL) 10 MG tablet Take 1 tablet by mouth daily (replaces Brisdelle) 24      folic acid (FOLVITE) 010 MCG tablet Take 400 mcg by mouth daily    Historical Provider, MD   calcium carbonate (OSCAL) 500 MG TABS tablet Take 500 mg by mouth 2 times daily    Historical Provider, MD   valACYclovir (VALTREX) 500 MG tablet Take 500 mg by mouth daily. Historical Provider, MD       Current medications:    No current facility-administered medications for this encounter. Allergies:  No Known Allergies    Problem List:    Patient Active Problem List   Diagnosis Code    Migraine equivalent G43.109       Past Medical History:        Diagnosis Date    High triglycerides 2013    Low back pain 2013    Migraine equivalent 2015       Past Surgical History:        Procedure Laterality Date    DILATION AND CURETTAGE OF UTERUS  1984    HYSTERECTOMY (CERVIX STATUS UNKNOWN)  2005    total    OVARY REMOVAL      TONSILLECTOMY  1960       Social History:    Social History     Tobacco Use    Smoking status: Never    Smokeless tobacco: Never   Substance Use Topics    Alcohol use: No                                Counseling given: Not Answered      Vital Signs (Current):  There were no vitals filed for this visit. BP Readings from Last 3 Encounters:   07/20/22 128/72   04/03/22 128/78   11/07/19 118/70       NPO Status:                                                                                 BMI:   Wt Readings from Last 3 Encounters:   07/20/22 146 lb 9.6 oz (66.5 kg)   04/03/22 130 lb (59 kg)   08/25/20 129 lb (58.5 kg)     There is no height or weight on file to calculate BMI.    CBC:   Lab Results   Component Value Date/Time    WBC 8.1 09/22/2015 11:10 AM    RBC 3.99 09/22/2015 11:10 AM    HGB 13.6 09/22/2015 11:10 AM    HCT 40.1 09/22/2015 11:10 AM    .3 09/22/2015 11:10 AM    RDW 13.7 09/22/2015 11:10 AM     09/22/2015 11:10 AM       CMP:   Lab Results   Component Value Date/Time     09/22/2015 11:10 AM    K 4.0 09/22/2015 11:10 AM     09/22/2015 11:10 AM    CO2 27 09/22/2015 11:10 AM    BUN 13 09/22/2015 11:10 AM    CREATININE 0.65 09/22/2015 11:10 AM    GFRAA >60.0 09/22/2015 11:10 AM    LABGLOM >60.0 09/22/2015 11:10 AM    GLUCOSE 89 07/23/2021 07:40 AM    PROT 7.0 09/22/2015 11:10 AM    CALCIUM 9.9 09/22/2015 11:10 AM    BILITOT 1.2 09/22/2015 11:10 AM    ALKPHOS 51 09/22/2015 11:10 AM    AST 22 09/22/2015 11:10 AM    ALT 20 09/22/2015 11:10 AM       POC Tests: No results for input(s): POCGLU, POCNA, POCK, POCCL, POCBUN, POCHEMO, POCHCT in the last 72 hours.     Coags:   Lab Results   Component Value Date/Time    PROTIME 9.8 09/22/2015 11:10 AM    INR 0.9 09/22/2015 11:10 AM    APTT 27.8 09/22/2015 11:10 AM       HCG (If Applicable): No results found for: PREGTESTUR, PREGSERUM, HCG, HCGQUANT     ABGs: No results found for: PHART, PO2ART, VUX4BYF, NYE1LSV, BEART, Z3MNFRMH     Type & Screen (If Applicable):  No results found for: LABABO, LABRH    Drug/Infectious Status (If Applicable):  No results found for: HIV, HEPCAB    COVID-19 Screening (If Applicable): No results found for: COVID19        Anesthesia Evaluation  Patient summary reviewed and Nursing notes reviewed no history of anesthetic complications:   Airway: Mallampati: II  TM distance: >3 FB   Neck ROM: full  Mouth opening: > = 3 FB   Dental: normal exam         Pulmonary:Negative Pulmonary ROS and normal exam                               Cardiovascular:Negative CV ROS  Exercise tolerance: good (>4 METS),         ECG reviewed               Beta Blocker:  Not on Beta Blocker         Neuro/Psych:   (+) headaches:,             GI/Hepatic/Renal: Neg GI/Hepatic/Renal ROS            Endo/Other: Negative Endo/Other ROS             Pt had PAT visit. Abdominal:             Vascular: negative vascular ROS. Other Findings:           Anesthesia Plan      MAC     ASA 2       Induction: intravenous.                             MARGARITO Bates - CRNA   8/10/2022

## 2022-08-10 NOTE — H&P
Patient Name: Tc Cruz  : 1960  MRN: [id number]  DATE: 08/10/22      ENDOSCOPY  History and Physical    Procedure:    [] Diagnostic Colonoscopy       [x] Screening Colonoscopy  [] EGD      [] ERCP      [] EUS       [] Other    [x] Previous office notes/History and Physical reviewed from the patients chart. Please see EMR for further details of HPI. I have examined the patient's status immediately prior to the procedure and:      Indications/HPI:    []Abdominal Pain   []Cancer- GI/Lung  []Fhx of colon CA/polyps  []History of Polyps   []Fosters   []Melena  []Abnormal Imaging   []Dysphagia    []Persistent Pneumonia  []Anemia   []Food Impaction  []History of Polyps  []GI Bleed   []Pulmonary nodule/Mass  []Change in bowel habits  []Heartburn/Reflux  []Rectal Bleed (BRBPR)  []Chest Pain - Non Cardiac  []Heme (+) Stool  []Ulcers  []Constipation   []Hemoptysis   []Varices  []Diarrhea   []Hypoxemia  []Nausea/Vomiting   [x]Screening   []Crohns/Colitis  []Other:    Anesthesia:   [x] MAC [] Moderate Sedation   [] General   [] None     ROS: 12 pt Review of Symptoms was negative unless mentioned above    Medications:   Prior to Admission medications    Medication Sig Start Date End Date Taking? Authorizing Provider   Erica Slater 1060 TEST KIT  22   Historical Provider, MD   meloxicam (MOBIC) 15 MG tablet Take 1 tablet by mouth in the morning. 22  Lauren Hatch APRN - NP   aspirin 81 MG tablet Take 81 mg by mouth daily    Historical Provider, MD   PARoxetine (PAXIL) 10 MG tablet Take 1 tablet by mouth daily (replaces Brisdelle)       folic acid (FOLVITE) 371 MCG tablet Take 400 mcg by mouth daily    Historical Provider, MD   calcium carbonate (OSCAL) 500 MG TABS tablet Take 500 mg by mouth 2 times daily    Historical Provider, MD   valACYclovir (VALTREX) 500 MG tablet Take 500 mg by mouth daily.     Historical Provider, MD       Allergies: No Known Allergies     History of allergic reaction to anesthesia:  No    Past Medical History:  Past Medical History:   Diagnosis Date    High triglycerides 2/19/2013    Low back pain 2/19/2013    Migraine equivalent 9/24/2015       Past Surgical History:  Past Surgical History:   Procedure Laterality Date    DILATION AND CURETTAGE OF UTERUS  1984    HYSTERECTOMY (CERVIX STATUS UNKNOWN)  2005    total    OVARY REMOVAL      TONSILLECTOMY  1960       Social History:  Social History     Tobacco Use    Smoking status: Never    Smokeless tobacco: Never   Vaping Use    Vaping Use: Never used   Substance Use Topics    Alcohol use: Yes     Comment: ocassional    Drug use: No       Vital Signs:   Vitals:    08/10/22 0755   BP: 118/77   Pulse: 77   Resp: 18   Temp: 98.6 °F (37 °C)   SpO2: 98%        Physical Exam:  Cardiac:  [x]WNL  []Comments:  Pulmonary:  [x]WNL   []Comments:   Neuro/Mental Status:  [x]WNL  []Comments:  Abdominal:  [x]WNL    []Comments:  Other:   []WNL  []Comments:    Informed Consent:  The risks and benefits of the procedure have been discussed with either the patient or if they cannot consent, their representative. Assessment:  Patient examined and appropriate for planned sedation and procedure. Plan:  Proceed with planned sedation and procedure as above.     Waylan Severs, MD  8:51 AM

## 2022-09-09 PROBLEM — Z12.11 SCREENING FOR MALIGNANT NEOPLASM OF COLON: Status: RESOLVED | Noted: 2022-08-10 | Resolved: 2022-09-09

## 2022-12-12 ENCOUNTER — HOSPITAL ENCOUNTER (OUTPATIENT)
Dept: WOMENS IMAGING | Age: 62
Discharge: HOME | End: 2022-12-14
Payer: COMMERCIAL

## 2022-12-12 DIAGNOSIS — Z12.31 BREAST CANCER SCREENING BY MAMMOGRAM: ICD-10-CM

## 2022-12-12 DIAGNOSIS — Z12.31 SCREENING MAMMOGRAM, ENCOUNTER FOR: ICD-10-CM

## 2022-12-12 PROCEDURE — 77063 BREAST TOMOSYNTHESIS BI: CPT

## 2023-01-17 ENCOUNTER — HOSPITAL ENCOUNTER (OUTPATIENT)
Dept: WOMENS IMAGING | Age: 63
Discharge: HOME | End: 2023-01-17
Attending: FAMILY MEDICINE
Payer: COMMERCIAL

## 2023-01-17 ENCOUNTER — HOSPITAL ENCOUNTER (OUTPATIENT)
Dept: ULTRASOUND IMAGING | Age: 63
Discharge: HOME | End: 2023-01-17
Attending: FAMILY MEDICINE
Payer: COMMERCIAL

## 2023-01-17 DIAGNOSIS — R92.8 ABNORMAL FINDING ON BREAST IMAGING: ICD-10-CM

## 2023-01-17 PROCEDURE — 76642 ULTRASOUND BREAST LIMITED: CPT

## 2023-01-17 PROCEDURE — 77061 BREAST TOMOSYNTHESIS UNI: CPT

## 2023-03-18 DIAGNOSIS — U07.1 COVID-19: Primary | ICD-10-CM

## 2023-03-18 RX ORDER — BENZONATATE 100 MG/1
100 CAPSULE ORAL EVERY 8 HOURS
Qty: 30 CAPSULE | Refills: 0 | Status: SHIPPED | OUTPATIENT
Start: 2023-03-18 | End: 2023-03-25

## 2023-07-10 ENCOUNTER — HOSPITAL ENCOUNTER (OUTPATIENT)
Dept: WOMENS IMAGING | Facility: HOSPITAL | Age: 63
Discharge: HOME | End: 2023-07-13
Payer: COMMERCIAL

## 2023-07-10 DIAGNOSIS — Z12.31 VISIT FOR SCREENING MAMMOGRAM: ICD-10-CM

## 2023-07-10 PROCEDURE — 77063 BREAST TOMOSYNTHESIS BI: CPT

## 2023-07-26 ENCOUNTER — HOSPITAL ENCOUNTER (OUTPATIENT)
Dept: WOMENS IMAGING | Facility: HOSPITAL | Age: 63
Discharge: HOME | End: 2023-07-29
Payer: COMMERCIAL

## 2023-07-26 DIAGNOSIS — R92.8 ABNORMAL FINDING ON MAMMOGRAPHY: ICD-10-CM

## 2023-07-26 PROCEDURE — 77065 DX MAMMO INCL CAD UNI: CPT

## 2023-07-28 LAB
CHOLEST SERPL-MCNC: 280 MG/DL (ref 0–199)
GLUCOSE SERPL-MCNC: 86 MG/DL (ref 70–99)
HBA1C MFR BLD: 5.6 % (ref 4.8–5.9)
HDLC SERPL-MCNC: 84 MG/DL (ref 40–59)
LDLC SERPL CALC-MCNC: 178 MG/DL (ref 0–129)
TRIGL SERPL-MCNC: 89 MG/DL (ref 0–150)

## 2023-12-22 ENCOUNTER — HOSPITAL ENCOUNTER (OUTPATIENT)
Dept: WOMENS IMAGING | Age: 63
Discharge: HOME | End: 2023-12-24
Payer: COMMERCIAL

## 2023-12-22 VITALS — HEIGHT: 64 IN | BODY MASS INDEX: 22.31 KG/M2

## 2023-12-22 DIAGNOSIS — Z12.31 SCREENING MAMMOGRAM FOR BREAST CANCER: ICD-10-CM

## 2023-12-22 PROCEDURE — 77067 SCR MAMMO BI INCL CAD: CPT

## 2024-06-12 ENCOUNTER — TELEPHONE (OUTPATIENT)
Dept: FAMILY MEDICINE CLINIC | Age: 64
End: 2024-06-12

## 2024-06-12 NOTE — TELEPHONE ENCOUNTER
PT scheduled OV through MyChart- PT has not yet established care- Called & left Vm need to reschedule as New PT- (30 min) can also schedule via [facility]

## 2024-07-11 ENCOUNTER — HOSPITAL ENCOUNTER (OUTPATIENT)
Dept: WOMENS IMAGING | Facility: HOSPITAL | Age: 64
Discharge: HOME | End: 2024-07-11
Payer: COMMERCIAL

## 2024-07-11 DIAGNOSIS — Z12.31 VISIT FOR SCREENING MAMMOGRAM: ICD-10-CM

## 2024-07-11 PROCEDURE — 77063 BREAST TOMOSYNTHESIS BI: CPT

## 2024-08-19 LAB
CHOLEST SERPL-MCNC: 261 MG/DL (ref 0–199)
GLUCOSE SERPL-MCNC: 95 MG/DL (ref 70–99)
HDLC SERPL-MCNC: 81 MG/DL (ref 40–59)
LDLC SERPL CALC-MCNC: 162 MG/DL (ref 0–129)
TRIGL SERPL-MCNC: 92 MG/DL (ref 0–150)

## 2024-11-26 ENCOUNTER — OFFICE VISIT (OUTPATIENT)
Dept: FAMILY MEDICINE CLINIC | Age: 64
End: 2024-11-26
Payer: COMMERCIAL

## 2024-11-26 VITALS
HEIGHT: 64 IN | BODY MASS INDEX: 23.56 KG/M2 | TEMPERATURE: 97.8 F | SYSTOLIC BLOOD PRESSURE: 126 MMHG | OXYGEN SATURATION: 100 % | RESPIRATION RATE: 16 BRPM | WEIGHT: 138 LBS | DIASTOLIC BLOOD PRESSURE: 74 MMHG | HEART RATE: 55 BPM

## 2024-11-26 DIAGNOSIS — K21.9 GASTROESOPHAGEAL REFLUX DISEASE WITHOUT ESOPHAGITIS: ICD-10-CM

## 2024-11-26 DIAGNOSIS — R06.83 SNORING: Primary | ICD-10-CM

## 2024-11-26 DIAGNOSIS — R06.81 WITNESSED EPISODE OF APNEA: ICD-10-CM

## 2024-11-26 DIAGNOSIS — Z78.0 POST-MENOPAUSAL: ICD-10-CM

## 2024-11-26 DIAGNOSIS — Z23 NEED FOR PNEUMOCOCCAL VACCINATION: ICD-10-CM

## 2024-11-26 PROCEDURE — 1123F ACP DISCUSS/DSCN MKR DOCD: CPT | Performed by: NURSE PRACTITIONER

## 2024-11-26 PROCEDURE — 99214 OFFICE O/P EST MOD 30 MIN: CPT | Performed by: NURSE PRACTITIONER

## 2024-11-26 PROCEDURE — 90471 IMMUNIZATION ADMIN: CPT | Performed by: NURSE PRACTITIONER

## 2024-11-26 PROCEDURE — 90677 PCV20 VACCINE IM: CPT | Performed by: NURSE PRACTITIONER

## 2024-11-26 RX ORDER — PANTOPRAZOLE SODIUM 20 MG/1
20 TABLET, DELAYED RELEASE ORAL
Qty: 90 TABLET | Refills: 0 | Status: SHIPPED | OUTPATIENT
Start: 2024-11-26

## 2024-11-26 SDOH — ECONOMIC STABILITY: FOOD INSECURITY: WITHIN THE PAST 12 MONTHS, THE FOOD YOU BOUGHT JUST DIDN'T LAST AND YOU DIDN'T HAVE MONEY TO GET MORE.: NEVER TRUE

## 2024-11-26 SDOH — ECONOMIC STABILITY: INCOME INSECURITY: HOW HARD IS IT FOR YOU TO PAY FOR THE VERY BASICS LIKE FOOD, HOUSING, MEDICAL CARE, AND HEATING?: NOT HARD AT ALL

## 2024-11-26 SDOH — ECONOMIC STABILITY: FOOD INSECURITY: WITHIN THE PAST 12 MONTHS, YOU WORRIED THAT YOUR FOOD WOULD RUN OUT BEFORE YOU GOT MONEY TO BUY MORE.: NEVER TRUE

## 2024-11-26 NOTE — PROGRESS NOTES
Kristie Marquez (:  1960) is a 64 y.o. female, Established patient, here for evaluation of the following chief complaint(s):  Establish Care (Patient is here to establish care today. Patient's previous PCP was Dr. Tanner. )          Subjective   History of Present Illness  The patient is a 64-year-old female who presents for evaluation of multiple medical concerns.    She has a history of migraines and has undergone a total hysterectomy, with her cervix still intact. She continues to have annual Pap tests with her gynecologist and receives prescriptions for mammograms. Her medication regimen includes calcium, folic acid, Paxil, and Valtrex. 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 Attempts to discontinue Paxil resulted in the onset of hot flashes.    She reports a persistent dry cough, which she believes started after her first COVID-19 infection and has not completely resolved. The cough is not daily and is not associated with nasal drainage or allergies. She reports no heartburn or indigestion and maintains normal bowel movements. The cough does not seem to be triggered by coffee, acidic food, or alcohol. She has had COVID-19 twice and feels the cough improved after the second infection. She occasionally experiences a tickling sensation in her throat, leading to a slight cough. She reports no difficulty swallowing or changes in her voice.    She reports snoring, which has become so severe that her  now sleeps in a separate room. She has asked him to listen for gasping sounds during her sleep.  She has woken up from her snoring a few times. She takes Tylenol PM at night. She feels rested upon waking and does not feel excessively tired during the day. She does not fall asleep at work or while driving. She is interested in exploring options such as CPAP machines or mouth guards to manage her snoring.    SOCIAL HISTORY  She denies tobacco, alcohol or drug

## 2024-12-23 ENCOUNTER — HOSPITAL ENCOUNTER (OUTPATIENT)
Dept: WOMENS IMAGING | Age: 64
Discharge: HOME | End: 2024-12-25
Payer: COMMERCIAL

## 2024-12-23 ENCOUNTER — TRANSCRIBE ORDERS (OUTPATIENT)
Dept: WOMENS IMAGING | Age: 64
End: 2024-12-23

## 2024-12-23 DIAGNOSIS — Z12.31 SCREENING MAMMOGRAM FOR BREAST CANCER: ICD-10-CM

## 2024-12-23 DIAGNOSIS — Z12.31 SCREENING MAMMOGRAM FOR BREAST CANCER: Primary | ICD-10-CM

## 2024-12-23 DIAGNOSIS — Z78.0 POST-MENOPAUSAL: ICD-10-CM

## 2024-12-23 PROCEDURE — 77063 BREAST TOMOSYNTHESIS BI: CPT

## 2024-12-23 PROCEDURE — 77080 DXA BONE DENSITY AXIAL: CPT

## 2025-02-13 ENCOUNTER — HOSPITAL ENCOUNTER (OUTPATIENT)
Dept: SLEEP CENTER | Age: 65
Discharge: HOME | End: 2025-02-15
Payer: COMMERCIAL

## 2025-02-13 PROCEDURE — 95806 SLEEP STUDY UNATT&RESP EFFT: CPT

## 2025-03-07 ENCOUNTER — TELEMEDICINE (OUTPATIENT)
Age: 65
End: 2025-03-07

## 2025-03-07 DIAGNOSIS — R06.83 SNORING: ICD-10-CM

## 2025-03-07 DIAGNOSIS — R05.3 CHRONIC COUGH: Primary | ICD-10-CM

## 2025-03-07 DIAGNOSIS — M81.0 AGE-RELATED OSTEOPOROSIS WITHOUT CURRENT PATHOLOGICAL FRACTURE: ICD-10-CM

## 2025-03-07 SDOH — ECONOMIC STABILITY: FOOD INSECURITY: WITHIN THE PAST 12 MONTHS, THE FOOD YOU BOUGHT JUST DIDN'T LAST AND YOU DIDN'T HAVE MONEY TO GET MORE.: NEVER TRUE

## 2025-03-07 SDOH — ECONOMIC STABILITY: FOOD INSECURITY: WITHIN THE PAST 12 MONTHS, YOU WORRIED THAT YOUR FOOD WOULD RUN OUT BEFORE YOU GOT MONEY TO BUY MORE.: NEVER TRUE

## 2025-03-07 ASSESSMENT — PATIENT HEALTH QUESTIONNAIRE - PHQ9
SUM OF ALL RESPONSES TO PHQ QUESTIONS 1-9: 0
1. LITTLE INTEREST OR PLEASURE IN DOING THINGS: NOT AT ALL
SUM OF ALL RESPONSES TO PHQ QUESTIONS 1-9: 0
2. FEELING DOWN, DEPRESSED OR HOPELESS: NOT AT ALL

## 2025-03-07 NOTE — PROGRESS NOTES
Kristie Marquez, was evaluated through a synchronous (real-time) audio-video encounter. The patient (or guardian if applicable) is aware that this is a billable service, which includes applicable co-pays. This Virtual Visit was conducted with patient's (and/or legal guardian's) consent. Patient identification was verified, and a caregiver was present when appropriate.   The patient was located at Home: [address] [place] [address] [place] [address]  Provider was located at Facility (Appt Dept): [address] [place] [address]  [place] [address]  Confirm you are appropriately licensed, registered, or certified to deliver care in the state where the patient is located as indicated above. If you are not or unsure, please re-schedule the visit: Yes, I confirm.     Kristie Marquez (:  1960) is a Established patient, presenting virtually for evaluation of the following:      Below is the assessment and plan developed based on review of pertinent history, physical exam, labs, studies, and medications.       Assessment & Plan  1. Mild sleep apnea.  The sleep study results indicate mild sleep apnea. She reports snoring as the primary symptom, which is causing significant disruption to her 's sleep. She does not experience daytime fatigue or other symptoms. A referral to Dr. Garcia, an ENT specialist, will be made for further evaluation and potential recommendations for managing the snoring. If significant symptoms such as excessive fatigue develop, CPAP therapy will be considered.    2. Persistent cough.  She reports a persistent cough that has not improved with Protonix. The cough is not severe but is bothersome. A referral to Dr. Garcia, an ENT specialist, will be made to evaluate the cough and provide any necessary recommendations.    3. Osteoporosis.  The bone density scan indicates osteoporosis, which increases the risk of fractures. She is advised to continue her current calcium intake of 1200 mg daily (600 mg in the morning

## 2025-04-07 ENCOUNTER — OFFICE VISIT (OUTPATIENT)
Age: 65
End: 2025-04-07
Payer: COMMERCIAL

## 2025-04-07 VITALS
HEART RATE: 71 BPM | BODY MASS INDEX: 24.75 KG/M2 | TEMPERATURE: 98.1 F | DIASTOLIC BLOOD PRESSURE: 75 MMHG | RESPIRATION RATE: 15 BRPM | OXYGEN SATURATION: 96 % | HEIGHT: 64 IN | SYSTOLIC BLOOD PRESSURE: 118 MMHG | WEIGHT: 145 LBS

## 2025-04-07 DIAGNOSIS — R06.83 SNORING: ICD-10-CM

## 2025-04-07 DIAGNOSIS — G47.33 OBSTRUCTIVE SLEEP APNEA: Primary | ICD-10-CM

## 2025-04-07 DIAGNOSIS — F45.8 BRUXISM: ICD-10-CM

## 2025-04-07 DIAGNOSIS — R05.3 CHRONIC COUGH: ICD-10-CM

## 2025-04-07 PROCEDURE — 99204 OFFICE O/P NEW MOD 45 MIN: CPT | Performed by: STUDENT IN AN ORGANIZED HEALTH CARE EDUCATION/TRAINING PROGRAM

## 2025-04-07 PROCEDURE — 31575 DIAGNOSTIC LARYNGOSCOPY: CPT | Performed by: STUDENT IN AN ORGANIZED HEALTH CARE EDUCATION/TRAINING PROGRAM

## 2025-04-07 PROCEDURE — 1123F ACP DISCUSS/DSCN MKR DOCD: CPT | Performed by: STUDENT IN AN ORGANIZED HEALTH CARE EDUCATION/TRAINING PROGRAM

## 2025-04-07 RX ORDER — OMEPRAZOLE 40 MG/1
40 CAPSULE, DELAYED RELEASE ORAL
Qty: 30 CAPSULE | Refills: 3 | Status: SHIPPED | OUTPATIENT
Start: 2025-04-07

## 2025-04-07 NOTE — PATIENT INSTRUCTIONS
may be required such as a swallowing study with barium, an endoscopic evaluation of the esophagus and stomach, or a probe that measures acidity (pH) in the throat and esophagus.     The most important treatment of LPR is dietary control. A somewhat bland diet, smaller but more frequent meals, avoidance of alcohol, tobacco and caffeine, and not eating within 3-4 hours of bedtime are the most important factors. Avoidance of acidic and fatty foods and mint are also important. Elevation of the head of the bed and avoidance of tight, binding clothing is of value. A person with reflux who is overweight should reduce weight, and reducing stress also frequently improves the symptoms.     Regular use of a recommended or prescribed medication for at least 2 months while observing dietary control is critical. Humidification, hydration, mucous thinners and avoidance of throat clearing are all of value for those people who have significant throat symptoms from gastroesophageal reflux.  Untreated LPR can lead to chronic swelling of the vocal folds, ulcerations of the vocal folds and formation of masses known as granulomas. LPR can also make asthma worse.

## 2025-04-07 NOTE — PROGRESS NOTES
history of these conditions. She reports no nasal symptoms, postnasal drip, nasal obstruction, or excessive nasal drainage. She also reports no heartburn or indigestion. She does not report any changes in her voice or medication regimen. She reports no shortness of breath during activities. She does not feel like there is any part of her throat that gets swollen or like there is something hanging back there. She experienced a coughing episode last night before bed but does not believe it was related to her diet, as she had consumed Mexican food, which she has eaten before without issue. She does not report any asymmetry in her nasal breathing. She has undergone tonsillectomy in the past. Despite a trial of Protonix for potential GERD, there was no improvement in her symptoms.    She also reports snoring, as observed by her . A sleep study was conducted over three nights at home, which revealed mild sleep apnea. She does not experience daytime fatigue or shortness of breath. Her snoring appears to be intermittent, with fewer episodes reported during a recent trip to [place]. She has not tried any interventions for her snoring. She has previously used mouth guards due to bruxism but does not report any symptoms of temporomandibular joint disorder. Her  has recently noted a decrease in her teeth grinding. She does not feel that her cough or snoring are significantly impacting her quality of life but is seeking advice on potential interventions. She expresses concern about the fit of a custom mouth guard due to ongoing dental work, including two crowns.      No Known Allergies    Current Outpatient Medications:     omeprazole (PRILOSEC) 40 MG delayed release capsule, Take 1 capsule by mouth every morning (before breakfast), Disp: 30 capsule, Rfl: 3    aspirin 81 MG tablet, Take 1 tablet by mouth daily, Disp: , Rfl:     PARoxetine (PAXIL) 10 MG tablet, Take 1 tablet by mouth daily (replaces Brisdelle),

## 2025-05-01 PROBLEM — G47.33 OSA (OBSTRUCTIVE SLEEP APNEA): Status: ACTIVE | Noted: 2025-05-01

## 2025-07-14 ENCOUNTER — HOSPITAL ENCOUNTER (OUTPATIENT)
Dept: WOMENS IMAGING | Facility: HOSPITAL | Age: 65
Discharge: HOME | End: 2025-07-17
Payer: MEDICARE

## 2025-07-14 DIAGNOSIS — Z12.31 OTHER SCREENING MAMMOGRAM: ICD-10-CM

## 2025-07-14 PROCEDURE — 77063 BREAST TOMOSYNTHESIS BI: CPT

## 2025-07-28 RX ORDER — OMEPRAZOLE 40 MG/1
40 CAPSULE, DELAYED RELEASE ORAL
Qty: 90 CAPSULE | Refills: 1 | Status: SHIPPED | OUTPATIENT
Start: 2025-07-28

## 2025-09-05 ENCOUNTER — TRANSCRIBE ORDERS (OUTPATIENT)
Dept: ADMINISTRATIVE | Age: 65
End: 2025-09-05

## 2025-09-05 DIAGNOSIS — Z12.31 ENCOUNTER FOR SCREENING MAMMOGRAM FOR MALIGNANT NEOPLASM OF BREAST: Primary | ICD-10-CM

## (undated) DEVICE — SINGLE PORT MANIFOLD: Brand: NEPTUNE 2

## (undated) DEVICE — ENDO CARRY-ON PROCEDURE KIT: Brand: ENDO CARRY-ON PROCEDURE KIT

## (undated) DEVICE — Device: Brand: ENDO SMARTCAP

## (undated) DEVICE — TUBING, SUCTION, 1/4" X 10', STRAIGHT: Brand: MEDLINE

## (undated) DEVICE — BRUSH ENDO CLN L90.5IN SHTH DIA1.7MM BRIST DIA5-7MM 2-6MM

## (undated) DEVICE — TUBE SET 96 MM 64 MM H2O PERISTALTIC STD AUX CHANNEL